# Patient Record
Sex: FEMALE | Race: BLACK OR AFRICAN AMERICAN | Employment: OTHER | URBAN - METROPOLITAN AREA
[De-identification: names, ages, dates, MRNs, and addresses within clinical notes are randomized per-mention and may not be internally consistent; named-entity substitution may affect disease eponyms.]

---

## 2024-04-03 ENCOUNTER — HOSPITAL ENCOUNTER (EMERGENCY)
Age: 79
Discharge: HOME | End: 2024-04-04
Attending: EMERGENCY MEDICINE

## 2024-04-03 ENCOUNTER — APPOINTMENT (OUTPATIENT)
Dept: CT IMAGING | Age: 79
End: 2024-04-03

## 2024-04-03 VITALS
SYSTOLIC BLOOD PRESSURE: 144 MMHG | OXYGEN SATURATION: 97 % | DIASTOLIC BLOOD PRESSURE: 69 MMHG | RESPIRATION RATE: 16 BRPM | TEMPERATURE: 98.2 F | HEART RATE: 99 BPM | WEIGHT: 121 LBS

## 2024-04-03 DIAGNOSIS — S32.010A CLOSED COMPRESSION FRACTURE OF BODY OF L1 VERTEBRA (HCC): Primary | ICD-10-CM

## 2024-04-03 PROCEDURE — 72131 CT LUMBAR SPINE W/O DYE: CPT

## 2024-04-03 PROCEDURE — 99284 EMERGENCY DEPT VISIT MOD MDM: CPT

## 2024-04-03 ASSESSMENT — PAIN DESCRIPTION - FREQUENCY: FREQUENCY: CONTINUOUS

## 2024-04-03 ASSESSMENT — PAIN DESCRIPTION - ORIENTATION: ORIENTATION: LOWER

## 2024-04-03 ASSESSMENT — PAIN SCALES - GENERAL: PAINLEVEL_OUTOF10: 5

## 2024-04-03 ASSESSMENT — PAIN - FUNCTIONAL ASSESSMENT: PAIN_FUNCTIONAL_ASSESSMENT: 0-10

## 2024-04-03 ASSESSMENT — PAIN DESCRIPTION - DESCRIPTORS: DESCRIPTORS: ACHING;SORE

## 2024-04-03 ASSESSMENT — PAIN DESCRIPTION - LOCATION: LOCATION: BACK

## 2024-04-03 ASSESSMENT — PAIN DESCRIPTION - PAIN TYPE: TYPE: ACUTE PAIN

## 2024-04-04 PROCEDURE — 6370000000 HC RX 637 (ALT 250 FOR IP): Performed by: EMERGENCY MEDICINE

## 2024-04-04 RX ORDER — IBUPROFEN 800 MG/1
800 TABLET ORAL EVERY 8 HOURS PRN
Qty: 20 TABLET | Refills: 0 | Status: SHIPPED | OUTPATIENT
Start: 2024-04-04

## 2024-04-04 RX ORDER — LIDOCAINE 4 G/G
1 PATCH TOPICAL DAILY
Status: DISCONTINUED | OUTPATIENT
Start: 2024-04-04 | End: 2024-04-04

## 2024-04-04 RX ORDER — LIDOCAINE 50 MG/G
1 PATCH TOPICAL DAILY
Qty: 30 PATCH | Refills: 0 | Status: SHIPPED | OUTPATIENT
Start: 2024-04-04

## 2024-04-04 RX ORDER — IBUPROFEN 800 MG/1
800 TABLET ORAL ONCE
Status: COMPLETED | OUTPATIENT
Start: 2024-04-04 | End: 2024-04-04

## 2024-04-04 RX ORDER — LIDOCAINE 4 G/G
1 PATCH TOPICAL
Status: DISCONTINUED | OUTPATIENT
Start: 2024-04-04 | End: 2024-04-04 | Stop reason: HOSPADM

## 2024-04-04 RX ADMIN — IBUPROFEN 800 MG: 800 TABLET, FILM COATED ORAL at 00:39

## 2024-04-04 ASSESSMENT — PAIN DESCRIPTION - LOCATION: LOCATION: BACK

## 2024-04-04 ASSESSMENT — PAIN - FUNCTIONAL ASSESSMENT: PAIN_FUNCTIONAL_ASSESSMENT: 0-10

## 2024-04-04 ASSESSMENT — PAIN SCALES - GENERAL: PAINLEVEL_OUTOF10: 5

## 2024-04-04 NOTE — ED NOTES
Pt arrived complaining of a fall occurring yesterday morning. Pt denies hitting head, LOC. Unable to get  for dialect, family is interpreting.

## 2024-04-04 NOTE — ED PROVIDER NOTES
Normocephalic, without obviousabnormality, atraumatic.   Eyes:  conjunctiva/corneas clear, EOM's intact.  Sclera anicteric.   ENT: Mucous membranes moist.   Neck: Supple, symmetrical, trachea midline, no adenopathy.  No jugular venous distention.     Lungs:   Clear to auscultation bilaterally, respirationsunlabored.  No rales, rhonchi or wheezes.   Chest Wall:  No tenderness.    Heart:  Regular rate and rhythm, S1 and S2 normal, no murmur, rub or gallop.   Abdomen:   Soft, non-tender, bowel sounds active,   no masses, no organomegaly.    Minimal tenderness on palpation no step-off no crepitus in the lumbar region   Extremities: No edema, cords or calf tenderness.  Full range of motion.   Pulses: 2+ and symmetric   Skin: Turgor is normal, no rashes or lesions.   Neurologic: Alert and oriented X 3.No focal findings.  Motor grossly normal.  Speech clear, no drift, CN III-XII grossly intact,        DIAGNOSTIC RESULTS   LABS:    Labs Reviewed - No data to display    All other labs were within normal range or not returned as of this dictation.    EKG: All EKG's are interpreted by the Emergency Department Physician who eithersigns or Co-signs this chart in the absence of a cardiologist.        RADIOLOGY:   Non-plain film images such as CT, Ultrasound and MRI are read by the radiologist. Plain radiographic images are visualized by myself.      *    Interpretation per the Radiologist below, if available at the time of this note:    CT LUMBAR SPINE WO CONTRAST   Final Result      1. Findings likely represent acute versus subacute mild L1 insufficiency fracture. No associated retropulsion or canal stenosis.   2. Lumbar degenerative disc disease as described with findings most pronounced at the L5-S1 level. Refer to body of report for specific findings for each level.      Electronically signed by MD Rj Roldan            PROCEDURES   Unless otherwise noted below, none     Procedures    *    CRITICAL CARE TIME